# Patient Record
(demographics unavailable — no encounter records)

---

## 2025-03-04 NOTE — DISCUSSION/SUMMARY
[FreeTextEntry1] :  Based on review of your fingerstick logs, due to the elevations noted, I recommend initiating treatment with mealtime insulin for lunch and dinner postprandial hyperglycemia. The patient is reluctant to start insulin at this time, she wishes to first try to control her blood glucose with Metformin. She stated she took Metformin in her last pregnancy with success. I informed her that insulin is the gold standard for treatment of GDM in pregnancy and is more likely to be successful in treating hyperglycemia. The patient expressed understanding, wishes to try oral biguanide therapy for approximately 2 weeks. Metformin 500 mg XR sent to her pharmacy to be taken in the morning. Ms. Gill was advised to continue following the ADA diet and testing schedule 4 times daily. I answered  the patient's questions to the best of my ability.  Recommend  testing to begin at 32 weeks.  Recommend MFM consultation for continued discussion.   This visit was provided via telemedicine using real-time 2-way audio and visual technology. The patient, HEIDE GILL, was located at home in [address] [place] [address] the originating site at the time of the visit. The provider, Cata Saleem, was located at [facility] in [place], [place]  the distant site at the time of the visit. The patient and provider all participated in the telehealth encounter. Verbal consent was given.   Thank you for requesting a consultation on this patient. The total time spent in preparation for this visit, medical history taking, orders, review of records, counseling the patient, and writing this note was 15 minutes.     Sincerely,     Cata Saleem MD FACOG Maternal-Fetal Medicine

## 2025-03-24 NOTE — OB HISTORY
[Definite:  ___ (Date)] : the last menstrual period was [unfilled] [Pregnancy History] : patient received anesthesia [___] : Delivery occurred at [unfilled] [LMP: ___] : LMP: [unfilled] [LEOBARDO: ___] : LEOBARDO: [unfilled] [EGA: ___ wks] : EGA: [unfilled] wks [Spontaneous] : Spontaneous conception [Sonogram] : sonogram [at ___ wks] : at [unfilled] weeks [FreeTextEntry1] : [facility] for hx of A2GDM, hx FGR, "atypical" NIPs, A2GDM and FGR. Pt is a 32 yr old  at 32w2d gestation today. Pt , pt had a prior pregnancy with A2GDM (metformin) and FGR. Pt was induced and delivered at 37wks. 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  GC = declined prenatal dx.  Level II = wnl.  BXA=249.  GTT= 84/211/158/140 mg/dL. Pt last spoke to dietary on 3/11 and has a f/u appt for 3/25. She is currently on Metformin 500mg BID (last increased from Metformin 500mg daily on 3/20) and PNV daily. Pt has 3 meals/day and a protein snack at HS. Pt checks her BS 4x daily but did not bring in her BS log today. Pt states her fastings range 80-96, 1 hr post breakfast 120-130s and 1 hour post lunch/dinner 140 - 150. No A1c. Last FGR on 3/11, EFW= 13%, AC= 6% and HIMA =wnl. Pt denies having any LOF, vaginal bleeding or ctxs. Sonogram today.

## 2025-03-24 NOTE — FAMILY HISTORY
[Age 35+ During Pregnancy] : not 35 or over during pregnancy [Reported Family History Of Birth Defects] : no neural tube defect [Jose Armando-Sachs Carrier] : no Jose Armando-Sachs [Family History] : no mental retardation/autism [Reported Family History Of Genetic Disease] : no history of child defect in child of baby father

## 2025-03-24 NOTE — VITALS
[LMP (date): ___] : LMP was on [unfilled] [GA =___ Weeks] : which calculates to a GA of [unfilled] weeks [GA= ___ Days] : and [unfilled] day(s) [LEOBARDO by LMP (date): ___] : The calculated LEOBARDO by LMP is [unfilled] [By LMP] : this is the final LEOBARDO

## 2025-03-24 NOTE — CHIEF COMPLAINT
[G ___] : G [unfilled] [P ___] : P [unfilled] [de-identified] : hx of A2GDM, hx FGR, "atypical" NIPs, A2GDM and FGR

## 2025-03-24 NOTE — DISCUSSION/SUMMARY
[FreeTextEntry1] : We had the pleasure of seeing your patient for a Maternal-Fetal Medicine consultation today. She was extensively counseled regarding the following issues:  Gestational diabetes on metformin (currently 500 mg twice daily): The patient was counseled regarding diet, blood sugar testing, and managing diabetes during pregnancy. Tight glycemic control in pregnancy is necessary to decrease fetal and  risks including macrosomia, shoulder dystocia, medically or obstetrically-indicated  delivery,  section, polyhydramnios, and preeclampsia. It was discussed that women who are able to maintain good glycemic control with diet and/or medication generally have favorable obstetric outcomes. She understands that fasting glucose values should be <95 and 1-hour postprandial values should be <140. Fingersticks should be checked and logged 4 times daily. Currently, her fasting glucose values are at goal. Her postprandial values, especially lunch and dinner, are often increased. I recommend increasing her morning metformin to 1000 mg at re-evaluating in 1 week with nutrition. If post-meal numbers remain high, would consider insulin. There is a 50% chance that she will acquire diabetes in her lifetime due to her diagnosis of diabetes in pregnancy. She is encouraged to have a two-hour glucose tolerance test at 6-8 weeks postpartum to evaluate for diabetes mellitus and insulin resistance.   Fetal growth restriction: I discussed the finding of fetal growth restriction (FGR) with the patient. When the estimated fetal weight (EFW) or the fetal abdominal circumference (AC) is less than the 10th percentile for gestational age on ultrasound, the pregnancy is said to be complicated by FGR. I explained that we are unable to determine, based on this finding alone, whether the fetus is constitutionally or pathologically small. FGR is characterized as "severe" when EFW is less than the 3rd percentile. FGR is described as "early onset" if the diagnosis is made before 32 weeks of gestation. Stillbirth,  death,  morbidity, and abnormal neurodevelopmental outcome are more common in growth-restricted fetuses than in those with normal growth. Therefore, weekly or twice weekly fetal testing, which includes nonstress test (NST), biophysical profile (BPP) score, and Dopplers, is recommended starting at the time of diagnosis and continuing until delivery. Patients with severe FGR and/or abnormal umbilical artery (UA) Dopplers are considered high-risk and require more frequent surveillance. Per our protocol, all growth restricted fetuses have twice weekly testing from 36 weeks until delivery. Delivery timing in cases of FGR is based on a number of factors, including: gestational age, Doppler of the umbilical artery, biophysical profile (BPP) score, and the presence or absence of risk factors for, or signs of, uteroplacental insufficiency. For FGR with normal UA Dopplers and EFW 3rd-10th percentile and/or AC <10th percentile, delivery is recommended between 38 0/7 - 39 6/7 weeks.  Obesity, class 1: Obesity is associated with an increased risk for pregnancy complications such as preeclampsia. Complications from surgery are increased, as well as failure of regional anesthesia. In addition, the fetus is at increased risk for congenital anomalies, most commonly neural tube defects and cardiac anomalies, even in the absence of diabetes.  Malformations are more difficult to assess by ultrasound evaluation due to the decreased sensitivity of ultrasound in women with a high BMI. During pregnancy, optimum weight gain recommended by the [employer] 2009 Guidelines is 11-20 pounds. Furthermore, pregnant women with obesity are at a greater risk for venous thromboembolism.  Weekly fetal testing Metformin 1000 mg in AM and 500 mg in PM Delivery 38-39 weeks due to FGR   Thank you for requesting a consultation on this patient. The total time spent in preparation for this visit, medical history taking, orders, review of records, counseling the patient, and writing this note was 60 minutes.  At the end of our discussion, the patient indicated that her questions were answered and she seemed satisfied with our discussion. Please do not hesitate to contact us with any questions.  Sincerely,   Julián Landeros MD, JET[facility] Attending Physician, Maternal-Fetal Medicine

## 2025-04-24 NOTE — DISCUSSION/SUMMARY
[FreeTextEntry1] : Dear Dr. Farnsworth,    We had the pleasure of seeing your patient, AGUSTIN HOOPER, for Maternal Fetal Medicine consultation on 2025. As you know, she is a  at 36 weeks referred for a history of GDMA2, FGR. She is feeling overall well today and without complaints.  Vital signs are normal  The following topics were discussed in detail:  1. Gestational Diabetes Agustin has been taking Metformin 500 mg BID for management of her gestational diabetes. On review of her last week of blood glucose logs, her most recent 2 fasting values were mildly elevated (95, 96). She has rare postprandial elevations which are mild (peak 150). She inquired about increasing her Metformin to 1000 mg at night to optimize glycemic control as delivery approaches. I recommended that she wait to see what her fasting value is tomorrow. If it is above 95, she should take 1000 mg qHS and 500 mg in the morning.   We discussed the risks of poorly controlled gestational diabetes on pregnancy, including fetal macrosomia, pregnancy loss, polyhydramnios with possible spontaneous  delivery, birth injury and  metabolic and respiratory complications. Maternal risks include increased risk of  or 3rd/4th degree lacerations after a vaginal delivery.   2. Fetal Growth Restriction Stillbirth,  death,  morbidity, and abnormal neurodevelopmental outcome are more common in growth-restricted fetuses than in those with normal growth. Therefore, weekly or twice weekly fetal testing, which includes nonstress test (NST), biophysical profile (BPP) score, and Dopplers, is recommended starting at the time of diagnosis and continuing until delivery. Patients with severe FGR and/or abnormal umbilical artery (UA) Dopplers are considered high-risk and require more frequent surveillance.   Delivery is recommended between 38 weeks and 38 weeks 6 days gestation.   Thank you for requesting a consultation on this patient. The total time spent in preparation for this visit, medical history taking, orders, review of records, counseling the patient, and writing this note was 36 minutes.   At the end of our discussion, the patient indicated that her questions were answered and she seemed satisfied with our discussion. Please do not hesitate to contact us with any questions.   Sincerely,     Cata Saleem MD FACOG Maternal-Fetal Medicine

## 2025-05-21 NOTE — HISTORY OF PRESENT ILLNESS
[Postpartum Follow Up] : postpartum follow up [Delivery Date: ___] : on [unfilled] [] : delivered by vaginal delivery [Female] : Delivery History: baby girl [Wt. ___] : weighing [unfilled]

## 2025-06-18 NOTE — HISTORY OF PRESENT ILLNESS
[Postpartum Follow Up] : postpartum follow up [Delivery Date: ___] : on [unfilled] [] : delivered by vaginal delivery [Female] : Delivery History: baby girl [Complications:___] : no complications [Breastfeeding] : currently nursing [Back to Normal] : is back to normal in size [None] : no vaginal bleeding [Cervix Sample Taken] : cervical sample not taken for a Pap smear [Examination Of The Breasts] : breasts are normal [Doing Well] : is doing well [FreeTextEntry8] : Postpartum. no complaints today.  [de-identified] : Had menses 6/10/25 [de-identified] : Plan for well woman visit. EPDS-2.  Contraception discussed - plan for mirena IUD.Plan for well woman visit

## 2025-07-25 NOTE — PROCEDURE
[IUD Placement] : intrauterine device (IUD) placement [Mirena IUD] : Mirena IUD [Prevention of Pregnancy] : prevention of pregnancy [Risks] : risks [Benefits] : benefits [Alternatives] : alternatives [Infection] : infection [Bleeding] : bleeding [Pain] : pain [Expulsion] : expulsion [Failure] : failure [Uterine Perforation] : uterine perforation [Neg Pregnancy Test] : negative pregnancy test [No Premedication] : No premedication [Tenaculum] : Tenaculum [Easy Passage] : Easy passage [Sounded to ___ cm] : sounded to [unfilled] ~Ucm [Tolerated Well] : Patient tolerated the procedure well [No Complications] : No complications [None] : None [de-identified] : [id number] [de-identified] : 08/2027

## 2025-07-25 NOTE — PROCEDURE
[IUD Placement] : intrauterine device (IUD) placement [Mirena IUD] : Mirena IUD [Prevention of Pregnancy] : prevention of pregnancy [Risks] : risks [Benefits] : benefits [Alternatives] : alternatives [Infection] : infection [Bleeding] : bleeding [Pain] : pain [Expulsion] : expulsion [Failure] : failure [Uterine Perforation] : uterine perforation [Neg Pregnancy Test] : negative pregnancy test [No Premedication] : No premedication [Tenaculum] : Tenaculum [Easy Passage] : Easy passage [Sounded to ___ cm] : sounded to [unfilled] ~Ucm [Tolerated Well] : Patient tolerated the procedure well [No Complications] : No complications [None] : None [de-identified] : [id number] [de-identified] : 08/2027